# Patient Record
Sex: MALE | Race: OTHER | NOT HISPANIC OR LATINO | Employment: OTHER | URBAN - METROPOLITAN AREA
[De-identification: names, ages, dates, MRNs, and addresses within clinical notes are randomized per-mention and may not be internally consistent; named-entity substitution may affect disease eponyms.]

---

## 2022-12-30 ENCOUNTER — TELEPHONE (OUTPATIENT)
Dept: PRIMARY CARE CLINIC | Facility: CLINIC | Age: 62
End: 2022-12-30
Payer: MEDICAID

## 2022-12-30 NOTE — TELEPHONE ENCOUNTER
----- Message from Christian Jackson MD sent at 12/29/2022  5:38 PM CST -----  Contact: Guicho  Jb tell him that I refilled his flomax and that he needs to sched a wellness visit after New Year...Thx    MM    ----- Message -----  From: Megan Dumont  Sent: 12/29/2022   3:05 PM CST  To: Christian Jackson MD, #    Type: Needs Medical Advice    Who Called: Guicho  Josiah Call Back Number: [phone]  Additional  Information: PT asking for a  call  back to get a refill on a medication for Prostate. PT have not taken medication in 1 day  Please Advise- Thank you

## 2023-01-04 ENCOUNTER — TELEPHONE (OUTPATIENT)
Dept: FAMILY MEDICINE | Facility: CLINIC | Age: 63
End: 2023-01-04
Payer: MEDICAID

## 2023-01-04 ENCOUNTER — TELEPHONE (OUTPATIENT)
Dept: PRIMARY CARE CLINIC | Facility: CLINIC | Age: 63
End: 2023-01-04
Payer: MEDICAID

## 2023-01-04 NOTE — TELEPHONE ENCOUNTER
----- Message from Christian Jackson MD sent at 1/3/2023  9:01 PM CST -----  Regarding: done  Contact: Guicho Oakley refilled..please tell him that..thx  ----- Message -----  From: Megan Dumont  Sent: 12/29/2022   3:05 PM CST  To: Christian Jackson MD, #    Type: Needs Medical Advice    Who Called: Guicho Rahman Call Back Number: [phone]  Additional  Information: PT asking for a  call  back to get a refill on a medication for Prostate. PT have not taken medication in 1 day  Please Advise- Thank you

## 2023-01-04 NOTE — TELEPHONE ENCOUNTER
----- Message from Megan Dumont sent at 12/29/2022  3:00 PM CST -----  Contact: Guicho  Type: Needs Medical Advice    Who Called: Guicho Rahman Call Back Number: [phone]  Additional  Information: PT asking for a  call  back to get a refill on a medication for Prostate. PT have not taken medication in 1 day  Please Advise- Thank you